# Patient Record
Sex: FEMALE | Race: WHITE
[De-identification: names, ages, dates, MRNs, and addresses within clinical notes are randomized per-mention and may not be internally consistent; named-entity substitution may affect disease eponyms.]

---

## 2019-03-28 PROBLEM — Z00.00 ENCOUNTER FOR PREVENTIVE HEALTH EXAMINATION: Status: ACTIVE | Noted: 2019-03-28

## 2019-04-10 ENCOUNTER — APPOINTMENT (OUTPATIENT)
Dept: OTOLARYNGOLOGY | Facility: CLINIC | Age: 84
End: 2019-04-10
Payer: MEDICARE

## 2019-04-10 VITALS
HEIGHT: 60 IN | BODY MASS INDEX: 20.62 KG/M2 | HEART RATE: 66 BPM | WEIGHT: 105 LBS | DIASTOLIC BLOOD PRESSURE: 73 MMHG | SYSTOLIC BLOOD PRESSURE: 131 MMHG

## 2019-04-10 DIAGNOSIS — H90.2 CONDUCTIVE HEARING LOSS, UNSPECIFIED: ICD-10-CM

## 2019-04-10 DIAGNOSIS — Z87.39 PERSONAL HISTORY OF OTHER DISEASES OF THE MUSCULOSKELETAL SYSTEM AND CONNECTIVE TISSUE: ICD-10-CM

## 2019-04-10 DIAGNOSIS — Z86.69 PERSONAL HISTORY OF OTHER DISEASES OF THE NERVOUS SYSTEM AND SENSE ORGANS: ICD-10-CM

## 2019-04-10 DIAGNOSIS — Z80.9 FAMILY HISTORY OF MALIGNANT NEOPLASM, UNSPECIFIED: ICD-10-CM

## 2019-04-10 PROCEDURE — 31231 NASAL ENDOSCOPY DX: CPT

## 2019-04-10 PROCEDURE — 99213 OFFICE O/P EST LOW 20 MIN: CPT | Mod: 25

## 2019-04-10 PROCEDURE — 69210 REMOVE IMPACTED EAR WAX UNI: CPT

## 2019-09-26 ENCOUNTER — APPOINTMENT (OUTPATIENT)
Dept: OTOLARYNGOLOGY | Facility: CLINIC | Age: 84
End: 2019-09-26
Payer: MEDICARE

## 2019-09-26 PROCEDURE — 31575 DIAGNOSTIC LARYNGOSCOPY: CPT

## 2019-09-26 PROCEDURE — 99213 OFFICE O/P EST LOW 20 MIN: CPT | Mod: 25

## 2019-09-26 NOTE — HISTORY OF PRESENT ILLNESS
[de-identified] : Patient usually seen for recurrent cerumen impaction. Today referred in by her dentist for a "white spot in the back of her throat that may also have been present last year. Photographs were taken and sent, when compared to the photographs of last year it is possible it was also present but the photographs are of lower quality according to the dentist whom I spoke with personally (Dr. Alyssa Boudreaux -[phone]).  Patient reports no odynophagia, dysphagia, bleeding, and is generally unaware of the lesion

## 2019-09-26 NOTE — ASSESSMENT
[FreeTextEntry1] : Small mucosalized tonsillith. Would not biopsy at this time. Recommend follow her clinically, followup in one month, then can follow at 6-12 month intervals when he see her for cerumen if nothing clinically concerned

## 2019-10-23 ENCOUNTER — APPOINTMENT (OUTPATIENT)
Dept: OTOLARYNGOLOGY | Facility: CLINIC | Age: 84
End: 2019-10-23
Payer: MEDICARE

## 2019-10-23 PROCEDURE — 99213 OFFICE O/P EST LOW 20 MIN: CPT

## 2019-10-23 NOTE — ASSESSMENT
[FreeTextEntry1] : Oropharynx lesion clinically consistent with small mucosalized tonsillith. Would not recommend biopsy at this time. Recommend follow her clinically, followup in  6-12 month intervals when we see her for cerumen if nothing clinically concerning in the meantime

## 2019-10-23 NOTE — HISTORY OF PRESENT ILLNESS
[de-identified] : Patient usually seen for recurrent cerumen impaction. Today referred in by her dentist for a "white spot in the back of her throat that may also have been present last year. Photographs were taken and sent, when compared to the photographs of last year it is possible it was also present but the photographs are of lower quality according to the dentist whom I spoke with personally (Dr. Alyssa Boudreaux [phone]).  Patient reports no odynophagia, dysphagia, bleeding, and is generally unaware of the lesion.\par \par The pt opted for watchful waiting and one month followup rather than biopsy. She notices no changes or symptoms

## 2019-10-25 ENCOUNTER — APPOINTMENT (OUTPATIENT)
Dept: OTOLARYNGOLOGY | Facility: CLINIC | Age: 84
End: 2019-10-25

## 2020-04-15 ENCOUNTER — APPOINTMENT (OUTPATIENT)
Dept: OTOLARYNGOLOGY | Facility: CLINIC | Age: 85
End: 2020-04-15

## 2020-10-21 ENCOUNTER — APPOINTMENT (OUTPATIENT)
Dept: OTOLARYNGOLOGY | Facility: CLINIC | Age: 85
End: 2020-10-21
Payer: MEDICARE

## 2020-10-21 VITALS — WEIGHT: 101 LBS | HEIGHT: 60 IN | TEMPERATURE: 94.3 F | BODY MASS INDEX: 19.83 KG/M2

## 2020-10-21 DIAGNOSIS — J31.0 CHRONIC RHINITIS: ICD-10-CM

## 2020-10-21 DIAGNOSIS — H93.293 OTHER ABNORMAL AUDITORY PERCEPTIONS, BILATERAL: ICD-10-CM

## 2020-10-21 PROCEDURE — 31231 NASAL ENDOSCOPY DX: CPT

## 2020-10-21 PROCEDURE — 69210 REMOVE IMPACTED EAR WAX UNI: CPT

## 2020-10-21 PROCEDURE — 99213 OFFICE O/P EST LOW 20 MIN: CPT | Mod: 25

## 2020-10-21 RX ORDER — VIT C/E/ZN/COPPR/LUTEIN/ZEAXAN 250MG-90MG
CAPSULE ORAL
Refills: 0 | Status: ACTIVE | COMMUNITY

## 2020-10-21 RX ORDER — NIACINAMIDE 500 MG
TABLET ORAL
Refills: 0 | Status: ACTIVE | COMMUNITY

## 2020-10-21 RX ORDER — CHROMIUM 200 MCG
TABLET ORAL
Refills: 0 | Status: ACTIVE | COMMUNITY

## 2020-10-21 NOTE — ASSESSMENT
[FreeTextEntry1] : Oropharynx lesion clinically consistent with small mucosalized tonsillith, unchanged 1 year later. Would not recommend biopsy at this time. \par \par Cerumen impaction, now resolved.  The patient was counseled in aural hygiene and Qtip avoidance.  I offered an audiogram, but the patient refused.\par \par Nonallergic rhinitis associated with mask.  Does not want to try Atrovent at this time\par \par FU 1 year

## 2020-10-21 NOTE — HISTORY OF PRESENT ILLNESS
[de-identified] : Patient usually seen for recurrent cerumen impaction. October 2019 was referred in by her dentist for a "white spot in the back of her throat that may also have been present last year. Photographs were taken and sent, when compared to the photographs of last year it is possible it was also present but the photographs are of lower quality according to the dentist whom I spoke with personally (Dr. Alyssa Boudreaux [phone]).  Patient reports no odynophagia, dysphagia, bleeding, and is generally unaware of the lesion.  Exam consistent with mucosalized tonsillith\par \par Today reports HL and rhinitis only when wearing a mask.  Denies facial pain, purulence, congestion\par \par The pt opted for watchful waiting and one month followup rather than biopsy. She notices no changes or symptoms

## 2021-05-12 ENCOUNTER — APPOINTMENT (OUTPATIENT)
Dept: OTOLARYNGOLOGY | Facility: CLINIC | Age: 86
End: 2021-05-12
Payer: MEDICARE

## 2021-05-12 VITALS — HEIGHT: 60 IN | BODY MASS INDEX: 19.83 KG/M2 | TEMPERATURE: 97.6 F | WEIGHT: 101 LBS

## 2021-05-12 DIAGNOSIS — J39.2 OTHER DISEASES OF PHARYNX: ICD-10-CM

## 2021-05-12 DIAGNOSIS — H61.22 IMPACTED CERUMEN, LEFT EAR: ICD-10-CM

## 2021-05-12 DIAGNOSIS — R22.0 LOCALIZED SWELLING, MASS AND LUMP, HEAD: ICD-10-CM

## 2021-05-12 DIAGNOSIS — R22.1 LOCALIZED SWELLING, MASS AND LUMP, HEAD: ICD-10-CM

## 2021-05-12 DIAGNOSIS — H60.8X3 OTHER OTITIS EXTERNA, BILATERAL: ICD-10-CM

## 2021-05-12 PROCEDURE — 69210 REMOVE IMPACTED EAR WAX UNI: CPT

## 2021-05-12 PROCEDURE — 99214 OFFICE O/P EST MOD 30 MIN: CPT | Mod: 25

## 2021-05-12 RX ORDER — MOMETASONE FUROATE 1 MG/ML
0.1 SOLUTION TOPICAL
Qty: 1 | Refills: 0 | Status: ACTIVE | COMMUNITY
Start: 2021-05-12 | End: 1900-01-01

## 2021-05-12 NOTE — HISTORY OF PRESENT ILLNESS
[de-identified] : Patient usually seen for recurrent cerumen impaction. October 2019 was referred in by her dentist for a "white spot in the back of her throat that may also have been present last year. Photographs were taken and sent, when compared to the photographs of last year it is possible it was also present but the photographs are of lower quality according to the dentist whom I spoke with personally (Dr. Alyssa Boudreaux [phone]).  Patient reports no odynophagia, dysphagia, bleeding, and is generally unaware of the lesion.  Exam consistent with mucosalized tonsillith\par \par Seen Oct 2020, reports HL and rhinitis only when wearing a mask.  Denies facial pain, purulence, congestion.  The pt opted for watchful waiting and one month followup rather than biopsy--area consistent with mucosalized tonsillith. She notices no throat changes or symptoms.  \par Today reports her ears feel clogged bilaterally.  No history of Spring allergies.

## 2021-05-12 NOTE — ASSESSMENT
[FreeTextEntry1] : Oropharynx lesion clinically consistent with small mucosalized tonsillith, now appears slightly bigger to palpation--I could only evacuate a small amount of debris.  At this point I did recommend CT of the next with IVC to r/o neoplasm.  It does appear to have changed since October's visit. \par \par Cerumen impaction, now resolved.  The patient was counseled in aural hygiene and Qtip avoidance.  I offered an audiogram, but the patient refused.\par \par Chronic eczematous otitis externa, I will prescribe Elocon\par \par Nonallergic rhinitis associated with mask.  Does not want to try Atrovent at this time\par \par FU after CT for further managemtn

## 2021-06-03 ENCOUNTER — RESULT REVIEW (OUTPATIENT)
Age: 86
End: 2021-06-03

## 2021-06-03 ENCOUNTER — APPOINTMENT (OUTPATIENT)
Dept: CT IMAGING | Facility: CLINIC | Age: 86
End: 2021-06-03
Payer: MEDICARE

## 2021-06-03 ENCOUNTER — OUTPATIENT (OUTPATIENT)
Dept: OUTPATIENT SERVICES | Facility: HOSPITAL | Age: 86
LOS: 1 days | End: 2021-06-03

## 2021-06-03 DIAGNOSIS — Z98.890 OTHER SPECIFIED POSTPROCEDURAL STATES: Chronic | ICD-10-CM

## 2021-06-03 PROCEDURE — 70491 CT SOFT TISSUE NECK W/DYE: CPT | Mod: 26,ME

## 2021-06-03 PROCEDURE — G1004: CPT

## 2021-06-07 ENCOUNTER — NON-APPOINTMENT (OUTPATIENT)
Age: 86
End: 2021-06-07